# Patient Record
Sex: FEMALE | Race: WHITE | NOT HISPANIC OR LATINO | Employment: FULL TIME | URBAN - METROPOLITAN AREA
[De-identification: names, ages, dates, MRNs, and addresses within clinical notes are randomized per-mention and may not be internally consistent; named-entity substitution may affect disease eponyms.]

---

## 2024-06-27 ENCOUNTER — TELEPHONE (OUTPATIENT)
Dept: NEUROLOGY | Facility: CLINIC | Age: 59
End: 2024-06-27

## 2024-06-27 ENCOUNTER — OFFICE VISIT (OUTPATIENT)
Dept: NEUROLOGY | Facility: CLINIC | Age: 59
End: 2024-06-27
Payer: COMMERCIAL

## 2024-06-27 ENCOUNTER — PATIENT ROUNDING (BHMG ONLY) (OUTPATIENT)
Dept: NEUROLOGY | Facility: CLINIC | Age: 59
End: 2024-06-27
Payer: COMMERCIAL

## 2024-06-27 VITALS
HEIGHT: 64 IN | WEIGHT: 137 LBS | HEART RATE: 82 BPM | DIASTOLIC BLOOD PRESSURE: 77 MMHG | SYSTOLIC BLOOD PRESSURE: 129 MMHG | BODY MASS INDEX: 23.39 KG/M2

## 2024-06-27 DIAGNOSIS — R90.82 WHITE MATTER ABNORMALITY ON MRI OF BRAIN: ICD-10-CM

## 2024-06-27 DIAGNOSIS — F41.8 DEPRESSION WITH ANXIETY: Primary | ICD-10-CM

## 2024-06-27 DIAGNOSIS — G25.0 ESSENTIAL TREMOR: ICD-10-CM

## 2024-06-27 RX ORDER — ATORVASTATIN CALCIUM 40 MG/1
40 TABLET, FILM COATED ORAL DAILY
Qty: 30 TABLET | Refills: 11 | Status: SHIPPED | OUTPATIENT
Start: 2024-06-27 | End: 2025-06-27

## 2024-06-27 RX ORDER — PRIMIDONE 50 MG/1
TABLET ORAL
Qty: 300 TABLET | Refills: 2 | Status: SHIPPED | OUTPATIENT
Start: 2024-06-27

## 2024-06-27 NOTE — ASSESSMENT & PLAN NOTE
Her  states that she is barrientos and she states that she does have some symptoms of racing thoughts and cries easily.  I discussed with her that I will refer her to psychiatry for further evaluation and treatment.

## 2024-06-27 NOTE — PROGRESS NOTES
"Chief Complaint  Tremors (No recent head/brain imaging. )    Subjective          Sapna Puentes is a 58 y.o. female who presents to [facility]  History of Present Illness  58-year-old woman evaluated for tremor, abnormal MRI of the brain.  She states that she has had a tremor for the last year.  Interferes with activities of daily living such as writing, eating, drinking.  There is no family history of tremor.  She saw another neurologist in the past and was told that she had multiple sclerosis and did a lumbar puncture and was told that it was negative.  She has white matter changes noted in the MRI that is noted to be moderate in severity.  I do not have the disc with me.  She is a smoker.  She has no history of hypertension.  She has hypercholesterolemia.  She is not taking medications for hyperlipidemia.    She is also complaining of memory loss.  She states that it is hard for her to remember things.  She has been  for 36 years.  She has children and grandchildren.  She has no history of depression.  She states that she cries easily and there is a lot of things in her mind.  She does not elaborate.    Objective   Vital Signs:   /77 (BP Location: Right arm, Patient Position: Sitting, Cuff Size: Adult)   Pulse 82   Ht 162.6 cm (64\")   Wt 62.1 kg (137 lb)   BMI 23.52 kg/m²     Physical Exam   Alert, fluent, phasic, follows commands well.  Mini-Mental score is 29 out of 30.  She missed the date she states that she did not know the date.  Clock drawing is intact.  She is able to give me the history as noted above.  Optic is a normal, visual fields are full, EMs full directions gaze, facial strength is full, soft palate elevation and tongue are normal.  There is no weakness of the upper or lower extremities individual muscle testing.  Reflexes are symmetrically decreased with cerebellar testing is intact.  Station gait stable tiptoe, heel walk, rubens and " tandem without difficulty.  There is a tremor noted hands extended and finger-to-nose testing as she reaches the target.  There is significant tremor noted on transferring water from 1 glass to the next as she is unable to transfer.  There is tremor noted in both hands trying to drink from an empty plastic cup.  There is tremor noted on Archimedes spiral.  Heart is regular rhythm normal rate.        Assessment and Plan  Diagnoses and all orders for this visit:    1. Depression with anxiety (Primary)  Assessment & Plan:  Her  states that she is barrientos and she states that she does have some symptoms of racing thoughts and cries easily.  I discussed with her that I will refer her to psychiatry for further evaluation and treatment.    Orders:  -     Duplex Carotid Ultrasound CAR; Future  -     Ambulatory Referral to Psychiatry    2. Essential tremor  Assessment & Plan:  I discussed with her that she has essential tremor.  I will start her on primidone at 25 mg initially and increase the dose by 50 mg every 3 days or after until her tremors improve versus taking up to 250 mg twice a day.  She is to stay at the lowest dose that controls her tremor.  I discussed with her that the higher doses can make her dizzy, sleepy unsteady so she is intoxicated.  It can also cause mood swings.  I showed her and her  videos regarding essential tremor and DBS for treatment refractive tremors.      3. White matter abnormality on MRI of brain  Assessment & Plan:  Discussed with her that the white matter changes noted on the report is most likely secondary to small vessel disease.  I encouraged her to stop smoking and I will start her on atorvastatin at 40 mg daily.  I discussed with her that she needs to lower her cholesterol level as well as her low density lipoprotein.  She will follow-up with her primary care.  Thank you for letting us participate in her care.      Other orders  -     atorvastatin (Lipitor) 40 MG tablet;  Take 1 tablet by mouth Daily.  Dispense: 30 tablet; Refill: 11  -     primidone (MYSOLINE) 50 MG tablet; Take 1/2 tablet and titrate as directed up to 5 tablets twice a day.  Dispense: 300 tablet; Refill: 2         Total time spent with the patient and coordinating patient care was 60 minutes.    Follow Up  No follow-ups on file.  Patient was given instructions and counseling regarding her condition or for health maintenance advice. Please see specific information pulled into the AVS if appropriate.

## 2024-06-27 NOTE — ASSESSMENT & PLAN NOTE
Discussed with her that the white matter changes noted on the report is most likely secondary to small vessel disease.  I encouraged her to stop smoking and I will start her on atorvastatin at 40 mg daily.  I discussed with her that she needs to lower her cholesterol level as well as her low density lipoprotein.  She will follow-up with her primary care.  Thank you for letting us participate in her care.

## 2024-06-27 NOTE — TELEPHONE ENCOUNTER
Provider: JUSTINE LAUGHLIN MD    Caller: DALILA    Relationship to Patient: PHARMACY    Pharmacy: [facility]    Phone Number: [phone]    Reason for Call: CALLING TO VERIFY THE DIRECTIONS OF THE PRIMIDONE.    When was the patient last seen: 6-27-24        PLEASE CALL & ADVISE

## 2024-06-27 NOTE — ASSESSMENT & PLAN NOTE
I discussed with her that she has essential tremor.  I will start her on primidone at 25 mg initially and increase the dose by 50 mg every 3 days or after until her tremors improve versus taking up to 250 mg twice a day.  She is to stay at the lowest dose that controls her tremor.  I discussed with her that the higher doses can make her dizzy, sleepy unsteady so she is intoxicated.  It can also cause mood swings.  I showed her and her  videos regarding essential tremor and DBS for treatment refractive tremors.

## 2024-06-28 ENCOUNTER — PRIOR AUTHORIZATION (OUTPATIENT)
Dept: NEUROLOGY | Facility: CLINIC | Age: 59
End: 2024-06-28
Payer: COMMERCIAL

## 2024-06-28 NOTE — TELEPHONE ENCOUNTER
Spoke with pharmacy. Was told they spoke with someone and were told the pt has the titration information.

## 2024-08-01 ENCOUNTER — HOSPITAL ENCOUNTER (OUTPATIENT)
Dept: CARDIOLOGY | Facility: HOSPITAL | Age: 59
Discharge: HOME | End: 2024-08-01
Admitting: PSYCHIATRY & NEUROLOGY
Payer: COMMERCIAL

## 2024-08-01 DIAGNOSIS — F41.8 DEPRESSION WITH ANXIETY: ICD-10-CM

## 2024-08-01 LAB
BH CV XLRA MEAS LEFT CAROTID BULB EDV: 14.3 CM/SEC
BH CV XLRA MEAS LEFT CAROTID BULB PSV: 43.8 CM/SEC
BH CV XLRA MEAS LEFT DIST CCA EDV: 27 CM/SEC
BH CV XLRA MEAS LEFT DIST CCA PSV: 76 CM/SEC
BH CV XLRA MEAS LEFT DIST ICA EDV: 29.4 CM/SEC
BH CV XLRA MEAS LEFT DIST ICA PSV: 65.8 CM/SEC
BH CV XLRA MEAS LEFT ICA/CCA RATIO: 0.9
BH CV XLRA MEAS LEFT MID ICA EDV: 34 CM/SEC
BH CV XLRA MEAS LEFT MID ICA PSV: 80.2 CM/SEC
BH CV XLRA MEAS LEFT PROX CCA EDV: 29.1 CM/SEC
BH CV XLRA MEAS LEFT PROX CCA PSV: 106.3 CM/SEC
BH CV XLRA MEAS LEFT PROX ECA EDV: 13 CM/SEC
BH CV XLRA MEAS LEFT PROX ECA PSV: 62.8 CM/SEC
BH CV XLRA MEAS LEFT PROX ICA EDV: 25.7 CM/SEC
BH CV XLRA MEAS LEFT PROX ICA PSV: 69 CM/SEC
BH CV XLRA MEAS LEFT VERTEBRAL A EDV: 20.3 CM/SEC
BH CV XLRA MEAS LEFT VERTEBRAL A PSV: 48 CM/SEC
BH CV XLRA MEAS RIGHT CAROTID BULB EDV: 15.8 CM/SEC
BH CV XLRA MEAS RIGHT CAROTID BULB PSV: 52.7 CM/SEC
BH CV XLRA MEAS RIGHT DIST CCA EDV: 21.4 CM/SEC
BH CV XLRA MEAS RIGHT DIST CCA PSV: 83.1 CM/SEC
BH CV XLRA MEAS RIGHT DIST ICA EDV: 36.4 CM/SEC
BH CV XLRA MEAS RIGHT DIST ICA PSV: 88.7 CM/SEC
BH CV XLRA MEAS RIGHT ICA/CCA RATIO: 0.9
BH CV XLRA MEAS RIGHT MID ICA EDV: 29.9 CM/SEC
BH CV XLRA MEAS RIGHT MID ICA PSV: 78 CM/SEC
BH CV XLRA MEAS RIGHT PROX CCA EDV: 21.7 CM/SEC
BH CV XLRA MEAS RIGHT PROX CCA PSV: 97.3 CM/SEC
BH CV XLRA MEAS RIGHT PROX ECA EDV: 14.7 CM/SEC
BH CV XLRA MEAS RIGHT PROX ECA PSV: 74.4 CM/SEC
BH CV XLRA MEAS RIGHT PROX ICA EDV: 29.5 CM/SEC
BH CV XLRA MEAS RIGHT PROX ICA PSV: 70.9 CM/SEC
BH CV XLRA MEAS RIGHT VERTEBRAL A EDV: 21.6 CM/SEC
BH CV XLRA MEAS RIGHT VERTEBRAL A PSV: 65.3 CM/SEC
LEFT ARM BP: NORMAL MMHG
RIGHT ARM BP: NORMAL MMHG

## 2024-08-01 PROCEDURE — 93880 EXTRACRANIAL BILAT STUDY: CPT

## 2024-08-29 ENCOUNTER — OFFICE VISIT (OUTPATIENT)
Dept: NEUROLOGY | Facility: CLINIC | Age: 59
End: 2024-08-29
Payer: COMMERCIAL

## 2024-08-29 VITALS
HEART RATE: 67 BPM | SYSTOLIC BLOOD PRESSURE: 173 MMHG | HEIGHT: 64 IN | DIASTOLIC BLOOD PRESSURE: 92 MMHG | WEIGHT: 138.6 LBS | BODY MASS INDEX: 23.66 KG/M2

## 2024-08-29 DIAGNOSIS — G25.0 ESSENTIAL TREMOR: Primary | ICD-10-CM

## 2024-08-29 DIAGNOSIS — R40.0 DAYTIME SOMNOLENCE: ICD-10-CM

## 2024-08-29 PROCEDURE — 99214 OFFICE O/P EST MOD 30 MIN: CPT | Performed by: PSYCHIATRY & NEUROLOGY

## 2024-08-29 RX ORDER — PROPRANOLOL HCL 60 MG
CAPSULE, EXTENDED RELEASE 24HR ORAL
Qty: 120 CAPSULE | Refills: 3 | Status: SHIPPED | OUTPATIENT
Start: 2024-08-29

## 2024-08-29 NOTE — PROGRESS NOTES
"Chief Complaint  Follow-up (Medication check-primidone.)    Subjective          Sapna Puentes is a 58 y.o. female who presents to [facility]  History of Present Illness  58-year-old woman here for follow-up of her tremor.  She states that the tremor bothers her with writing as well as drinking her cup of coffee.  She took primidone up to 250 mg twice a day and it did help her significantly with the tremor however she was feeling very tired and worn out.  She has energy in the morning but she becomes tired throughout the day.  She lowered the dose to 200 mg twice a day and it is not helping her tremor significantly.    Objective   Vital Signs:   /92   Pulse 67   Ht 162.6 cm (64.02\")   Wt 62.9 kg (138 lb 9.6 oz)   BMI 23.78 kg/m²     Physical Exam   Alert, fluent, phasic, follows commands well.  There is a tremor noted hands extended finger-to-nose testing as well as significant tremor trying to put water from 1 cup to the next.  There is significant tremor noted Archimedes spiral.        Assessment and Plan  Diagnoses and all orders for this visit:    1. Essential tremor (Primary)  Assessment & Plan:  She will cut down her primidone to 50 mg twice a day for 1 week, 50 mg daily the second week and then stop taking primidone.  On the fourth week she can start taking propranolol and acting 60 mg the first week and increase the dose by 60 mg weekly until her tremors improved which is taken up to 240 mg daily.  I discussed with her that the higher doses can cause her to have a syncopal episode, feeling dizzy when getting up, tired and fatigued.  She is to get her blood pressure and pulse rate which she has an electronic device that can measure blood pressure and pulse rate and she is not to increase the dose if her pulse rate is below 60 or her blood pressure is less than 105/50.  I will see her again in 2 months time for follow-up.  Thank for letting me " participate in her care      2. Daytime somnolence  Assessment & Plan:  I will refer her to sleep medicine.    Orders:  -     Ambulatory Referral to Sleep Medicine    Other orders  -     propranolol LA (Inderal LA) 60 MG 24 hr capsule; Take 1 daily x 1 week and increase by 1 tablet weekly as needed up to 4 p.o. daily.  Dispense: 120 capsule; Refill: 3         Total time spent with the patient and coordinating patient care was 30 minutes.    Follow Up  No follow-ups on file.  Patient was given instructions and counseling regarding her condition or for health maintenance advice. Please see specific information pulled into the AVS if appropriate.

## 2024-08-29 NOTE — ASSESSMENT & PLAN NOTE
She will cut down her primidone to 50 mg twice a day for 1 week, 50 mg daily the second week and then stop taking primidone.  On the fourth week she can start taking propranolol and acting 60 mg the first week and increase the dose by 60 mg weekly until her tremors improved which is taken up to 240 mg daily.  I discussed with her that the higher doses can cause her to have a syncopal episode, feeling dizzy when getting up, tired and fatigued.  She is to get her blood pressure and pulse rate which she has an electronic device that can measure blood pressure and pulse rate and she is not to increase the dose if her pulse rate is below 60 or her blood pressure is less than 105/50.  I will see her again in 2 months time for follow-up.  Thank for letting me participate in her care

## 2024-09-03 ENCOUNTER — PRIOR AUTHORIZATION (OUTPATIENT)
Dept: NEUROLOGY | Facility: CLINIC | Age: 59
End: 2024-09-03
Payer: COMMERCIAL

## 2024-09-04 ENCOUNTER — PRIOR AUTHORIZATION (OUTPATIENT)
Dept: NEUROLOGY | Facility: CLINIC | Age: 59
End: 2024-09-04
Payer: COMMERCIAL